# Patient Record
Sex: MALE | Race: WHITE | NOT HISPANIC OR LATINO | Employment: FULL TIME | URBAN - METROPOLITAN AREA
[De-identification: names, ages, dates, MRNs, and addresses within clinical notes are randomized per-mention and may not be internally consistent; named-entity substitution may affect disease eponyms.]

---

## 2017-08-08 RX ORDER — IBUPROFEN 800 MG/1
800 TABLET ORAL EVERY 8 HOURS PRN
COMMUNITY

## 2017-08-08 RX ORDER — GABAPENTIN 300 MG/1
300 CAPSULE ORAL NIGHTLY
COMMUNITY

## 2017-08-08 RX ORDER — BACLOFEN 20 MG/1
20 TABLET ORAL 2 TIMES DAILY
COMMUNITY

## 2017-08-09 ENCOUNTER — OFFICE VISIT (OUTPATIENT)
Dept: NEUROSURGERY | Facility: CLINIC | Age: 42
End: 2017-08-09

## 2017-08-09 VITALS
HEIGHT: 70 IN | DIASTOLIC BLOOD PRESSURE: 96 MMHG | TEMPERATURE: 97.4 F | WEIGHT: 198 LBS | SYSTOLIC BLOOD PRESSURE: 130 MMHG | BODY MASS INDEX: 28.35 KG/M2

## 2017-08-09 DIAGNOSIS — M48.061 STENOSIS OF LATERAL RECESS OF LUMBAR SPINE: ICD-10-CM

## 2017-08-09 DIAGNOSIS — M54.59 MECHANICAL LOW BACK PAIN: ICD-10-CM

## 2017-08-09 DIAGNOSIS — M51.36 DDD (DEGENERATIVE DISC DISEASE), LUMBAR: Primary | ICD-10-CM

## 2017-08-09 PROCEDURE — 99203 OFFICE O/P NEW LOW 30 MIN: CPT | Performed by: PHYSICIAN ASSISTANT

## 2017-08-09 NOTE — PROGRESS NOTES
[facility]    Patient: Aamir Mayberry      Date: 17  : 1975   MRN: [id number]    PCP: Osmin Smith MD  Patient Care Team:  Osmin Smith MD as PCP - General (Family Medicine)  Osmin Smith MD as Referring Physician (Family Medicine)      CHIEF COMPLIANT:  Low back pain    HISTORY OF PRESENT ILLNESS  Mr Mayberry is a pleasant 42 y.o. new patient who presents to the office today complaining of back pain for several years.  His describes the pain as sharp, intermittent pain starting in his back and radiating down both legs to the back of the knees.  Right > Left.  No numbness or tingling.  His pain is worsened with standing or walking, and improved with rest, muscle relaxers, Neurontin, and ibuprofen.  He has been seen by [facility] and has received 2 epidural injections in February and July of this year.  The epidural injection helped decrease his pain which usually is a 7-8/10 to a 5/10.  No fevers, chills, headaches, saddle anesthesia, or B/B changes.  He reports that his PCP referred him here today for a consult.    Review of Systems   Constitutional: Negative for activity change, appetite change, chills, diaphoresis, fatigue, fever and unexpected weight change.   HENT: Negative for congestion, dental problem, drooling, ear discharge, ear pain, facial swelling, hearing loss, mouth sores, nosebleeds, postnasal drip, rhinorrhea, sinus pressure, sneezing, sore throat, tinnitus, trouble swallowing and voice change.    Eyes: Negative for photophobia, pain, discharge, redness, itching and visual disturbance.   Respiratory: Negative for apnea, cough, choking, chest tightness, shortness of breath, wheezing and stridor.    Cardiovascular: Negative for chest pain, palpitations and leg swelling.   Gastrointestinal: Negative for abdominal distention, abdominal pain, anal bleeding, blood in stool, constipation, diarrhea,  nausea, rectal pain and vomiting.   Endocrine: Negative for cold intolerance, heat intolerance, polydipsia, polyphagia and polyuria.   Genitourinary: Negative for decreased urine volume, difficulty urinating, dysuria, enuresis, flank pain, frequency, genital sores, hematuria and urgency.   Musculoskeletal: Positive for back pain. Negative for arthralgias, gait problem, joint swelling, myalgias, neck pain and neck stiffness.   Skin: Negative for color change, pallor, rash and wound.   Allergic/Immunologic: Negative for environmental allergies, food allergies and immunocompromised state.   Neurological: Negative for dizziness, tremors, seizures, syncope, facial asymmetry, speech difficulty, weakness, light-headedness, numbness and headaches.   Hematological: Negative for adenopathy. Does not bruise/bleed easily.   Psychiatric/Behavioral: Negative for agitation, behavioral problems, confusion, decreased concentration, dysphoric mood, hallucinations, self-injury, sleep disturbance and suicidal ideas. The patient is not nervous/anxious and is not hyperactive.         The patient's past medical history, past surgical history, family history, and social history have been reviewed at length in the electronic medical record.      Past Medical History:   Diagnosis Date   • DDD (degenerative disc disease), lumbar    • Hyperlipidemia    • Insomnia    • Irritable bowel syndrome (IBS)      Past Surgical History:   Procedure Laterality Date   • NO PAST SURGERIES       Family History   Problem Relation Age of Onset   • Stroke Paternal Grandmother    • Hyperlipidemia Paternal Grandfather    • Hypertension Paternal Grandfather    • Heart attack Paternal Grandfather      Social History     Social History   • Marital status:      Spouse name: N/A   • Number of children: N/A   • Years of education: N/A     Occupational History   • Not on file.     Social History Main Topics   • Smoking status: Never Smoker   • Smokeless tobacco:  "Never Used   • Alcohol use Yes      Comment: occasional   • Drug use: No   • Sexual activity: Defer     Other Topics Concern   • Not on file     Social History Narrative     Outpatient Prescriptions Marked as Taking for the 8/9/17 encounter (Office Visit) with Lea Rosenberg PA-C   Medication Sig Dispense Refill   • baclofen (LIORESAL) 20 MG tablet Take 20 mg by mouth 2 (Two) Times a Day.     • gabapentin (NEURONTIN) 300 MG capsule Take 300 mg by mouth Every Night.     • ibuprofen (ADVIL,MOTRIN) 800 MG tablet Take 800 mg by mouth Every 8 (Eight) Hours As Needed for Mild Pain (1-3).         Current Outpatient Prescriptions:   •  baclofen (LIORESAL) 20 MG tablet, Take 20 mg by mouth 2 (Two) Times a Day., Disp: , Rfl:   •  gabapentin (NEURONTIN) 300 MG capsule, Take 300 mg by mouth Every Night., Disp: , Rfl:   •  ibuprofen (ADVIL,MOTRIN) 800 MG tablet, Take 800 mg by mouth Every 8 (Eight) Hours As Needed for Mild Pain (1-3)., Disp: , Rfl:   Allergies as of 08/09/2017 - Eber as Reviewed 08/09/2017   Allergen Reaction Noted   • Sulfa antibiotics  08/08/2017       PHYSICAL EXAM      Vitals: Temp 97.4 °F (36.3 °C) (Temporal Artery )   Ht 69.5\" (176.5 cm)  Wt 198 lb (89.8 kg)  BMI 28.82 kg/m2   General Appearance:   WDWN, alert, and cooperative. NAD.   HEENT: NCAT   Chest: Breathing unlabored.   Neck: Appropriate ROM.  No masses or tenderness.    Back: Appropriate ROM.  No masses or tenderness.    Extremities: No edema or deformities.   Psych: Appropriate mood and affect.   Higher Integrative Fx: AAOx3. Speech intact.   Motor: Normal muscle strength, bulk and tone in upper and lower extremities.  No fasciculations, rigidity, spasticity, or abnormal movements.    Sensation: Sensation is intact to light touch testing throughout.   Reflexes: 2+ and symmetric in the upper and lower extremities.   Station and Gait: Station and gait are normal.   Special Test: No Sargent's signs, there is no ankle clonus.  Straight leg raise is " negative.   Daniel's sign is negative.       Medical Decision Making  Data Review:    Lumbar MRI dated 12/2016 reveals degenerative changes with moderate recess stenosis at L5-S1.  Annular fissure at L4-5.  No significant stenosis spinal stenosis.  No spondylolisthesis.  No acute abnormalities.        RIC reviewed at today's visit.   Reviewed records from PCP, [facility], and most recent MRI.      ASSESSMENT    ICD-10-CM ICD-9-CM   1. DDD (degenerative disc disease), lumbar M51.36 722.52   2. Mechanical low back pain M54.5 724.2   3. Stenosis of lateral recess of lumbar spine M48.06 724.02         DISCUSSION  Aamir is a new patient who presents today for the evaluation of his long-standing back pain.  His back pain is degenerative/mechanical in nature. Surgical intervention is not likely to improve his back pain symptoms, and would likely improve any radicular symptoms.  A myelogram would be useful to determine any root compromise, specifically at L5-S1, where he has mild to moderate lateral recess stenosis.  Discussed that if he has significant leg symptoms, we can order a myelogram.  He wishes to discuss this with his family and will call us if he wants to move forward with further workup.      At this time, supportive treatment is recommended for his back pain.  (ice, heat, NSAIDs, MR, pain management, therapy)  He deferred therapy referral today.  He will continue to follow up with his pain management clinic at [facility].      Return if symptoms worsen or fail to improve. He should call or RTC sooner if symptoms worsen or new symptoms develop.     Time:   30 minutes with more than 50% of time spent counseling and coordination of care with patient.  Discussed impressions, prognosis, workup, risk/benefits of treatment options with patient, and risk factor reductions today.     MARTHA Aguero MD  [facility]  08/09/17  3:03 PM